# Patient Record
Sex: MALE | Race: WHITE | Employment: FULL TIME | URBAN - METROPOLITAN AREA
[De-identification: names, ages, dates, MRNs, and addresses within clinical notes are randomized per-mention and may not be internally consistent; named-entity substitution may affect disease eponyms.]

---

## 2023-01-16 ENCOUNTER — HOSPITAL ENCOUNTER (OUTPATIENT)
Dept: ULTRASOUND IMAGING | Age: 43
Discharge: HOME | End: 2023-01-16
Payer: COMMERCIAL

## 2023-01-16 DIAGNOSIS — R10.9 STOMACH ACHE: ICD-10-CM

## 2023-01-16 PROCEDURE — 76700 US EXAM ABDOM COMPLETE: CPT

## 2024-01-30 NOTE — PROGRESS NOTES
Interventional Cardiology Consultation     Sher Fields  1980    PCP: Jocelyne Ford APRN - NP  Referring Physician: [facility]  Reason for Referral: bradycardia  Chief Complaint: \"I was in the ER\"  Chief Complaint   Patient presents with    Establish Cardiologist     Chest pains        Subjective:   History of Present Illness: The patient is 43 y.o. male with a past medical history significant for hypertension. Patient recently presented to the [facility] ED 1/11/2024 following a syncopal episode. EKG showed patient to be bradycardic, which prompted the referral. Patient here to establish cardiac care. Patient reports he had a tooth removed 1/11/24 with no problems. Was home when he start feeling \"in and out of it\". Reports lightheadedness and syncope.  His wife was home and called EMS. Patient reports he did not feel himself until the ambulance ride. Patient was taken to [facility] where they did an EKG which came back abnormal, showed bradycardia. Patient denies any recurrent episodes like this. Patient denies any issues with sleeping, lying flat. Patient does report he snores.           Past Medical History:   Diagnosis Date    Hypertension      History reviewed. No pertinent surgical history.  Family History   Problem Relation Age of Onset    Heart Disease Father      Social History     Tobacco Use    Smoking status: Former     Types: Cigarettes    Smokeless tobacco: Former       No Known Allergies    Current Outpatient Medications   Medication Sig Dispense Refill    FLUoxetine (PROZAC) 20 MG capsule Take 1 capsule by mouth daily      lisinopril (PRINIVIL;ZESTRIL) 10 MG tablet       omeprazole (PRILOSEC) 20 MG delayed release capsule        No current facility-administered medications for this visit.       Review of Systems:  Constitutional: No unanticipated weight loss. There's been no change in energy level, sleep pattern, or activity level. No fevers, chills.   Eyes: No

## 2024-02-05 ENCOUNTER — OFFICE VISIT (OUTPATIENT)
Dept: CARDIOLOGY CLINIC | Age: 44
End: 2024-02-05

## 2024-02-05 ENCOUNTER — TELEPHONE (OUTPATIENT)
Dept: CARDIOLOGY CLINIC | Age: 44
End: 2024-02-05

## 2024-02-05 VITALS
HEART RATE: 68 BPM | WEIGHT: 238 LBS | DIASTOLIC BLOOD PRESSURE: 82 MMHG | SYSTOLIC BLOOD PRESSURE: 118 MMHG | OXYGEN SATURATION: 97 %

## 2024-02-05 DIAGNOSIS — R55 VASOVAGAL SYNCOPE: ICD-10-CM

## 2024-02-05 DIAGNOSIS — R00.1 BRADYCARDIA: Primary | ICD-10-CM

## 2024-02-05 RX ORDER — FLUOXETINE HYDROCHLORIDE 20 MG/1
20 CAPSULE ORAL DAILY
COMMUNITY

## 2024-02-05 RX ORDER — LISINOPRIL 10 MG/1
TABLET ORAL
COMMUNITY
Start: 2024-01-31

## 2024-02-05 RX ORDER — OMEPRAZOLE 20 MG/1
CAPSULE, DELAYED RELEASE ORAL
COMMUNITY
Start: 2024-01-08

## 2024-02-05 NOTE — TELEPHONE ENCOUNTER
Monitor placed by Charlene Brown  Monitor company [employer]  Length of monitor 30 days  Monitor ordered by Dr. García  Serial number [id number]  Kit ID   Activation successful prior to pt leaving office? Yes        Placed on spreadsheet.

## 2024-02-06 PROCEDURE — 93270 REMOTE 30 DAY ECG REV/REPORT: CPT | Performed by: INTERNAL MEDICINE

## 2024-02-20 ENCOUNTER — HOSPITAL ENCOUNTER (OUTPATIENT)
Dept: NON INVASIVE DIAGNOSTICS | Age: 44
Discharge: HOME | End: 2024-02-20
Payer: COMMERCIAL

## 2024-02-20 DIAGNOSIS — R00.1 BRADYCARDIA: ICD-10-CM

## 2024-02-20 DIAGNOSIS — R55 VASOVAGAL SYNCOPE: ICD-10-CM

## 2024-02-20 PROCEDURE — 93306 TTE W/DOPPLER COMPLETE: CPT

## 2024-02-26 ENCOUNTER — OFFICE VISIT (OUTPATIENT)
Dept: SLEEP MEDICINE | Age: 44
End: 2024-02-26
Payer: COMMERCIAL

## 2024-02-26 VITALS
BODY MASS INDEX: 32.15 KG/M2 | OXYGEN SATURATION: 100 % | DIASTOLIC BLOOD PRESSURE: 70 MMHG | SYSTOLIC BLOOD PRESSURE: 115 MMHG | HEIGHT: 72 IN | WEIGHT: 237.4 LBS | HEART RATE: 50 BPM | TEMPERATURE: 98.2 F | RESPIRATION RATE: 18 BRPM

## 2024-02-26 DIAGNOSIS — R00.1 BRADYCARDIA: ICD-10-CM

## 2024-02-26 DIAGNOSIS — R06.83 SNORING: ICD-10-CM

## 2024-02-26 DIAGNOSIS — I10 ESSENTIAL HYPERTENSION: ICD-10-CM

## 2024-02-26 DIAGNOSIS — G47.33 OSA (OBSTRUCTIVE SLEEP APNEA): Primary | ICD-10-CM

## 2024-02-26 PROCEDURE — 3074F SYST BP LT 130 MM HG: CPT | Performed by: PSYCHIATRY & NEUROLOGY

## 2024-02-26 PROCEDURE — 3078F DIAST BP <80 MM HG: CPT | Performed by: PSYCHIATRY & NEUROLOGY

## 2024-02-26 PROCEDURE — G8417 CALC BMI ABV UP PARAM F/U: HCPCS | Performed by: PSYCHIATRY & NEUROLOGY

## 2024-02-26 PROCEDURE — 1036F TOBACCO NON-USER: CPT | Performed by: PSYCHIATRY & NEUROLOGY

## 2024-02-26 PROCEDURE — 99204 OFFICE O/P NEW MOD 45 MIN: CPT | Performed by: PSYCHIATRY & NEUROLOGY

## 2024-02-26 PROCEDURE — G8484 FLU IMMUNIZE NO ADMIN: HCPCS | Performed by: PSYCHIATRY & NEUROLOGY

## 2024-02-26 PROCEDURE — G8427 DOCREV CUR MEDS BY ELIG CLIN: HCPCS | Performed by: PSYCHIATRY & NEUROLOGY

## 2024-02-26 ASSESSMENT — ENCOUNTER SYMPTOMS
ALLERGIC/IMMUNOLOGIC NEGATIVE: 1
RESPIRATORY NEGATIVE: 1
EYES NEGATIVE: 1
GASTROINTESTINAL NEGATIVE: 1

## 2024-02-26 ASSESSMENT — SLEEP AND FATIGUE QUESTIONNAIRES
HOW LIKELY ARE YOU TO NOD OFF OR FALL ASLEEP WHILE SITTING QUIETLY AFTER LUNCH WITHOUT ALCOHOL: 0
HOW LIKELY ARE YOU TO NOD OFF OR FALL ASLEEP WHILE WATCHING TV: 2
HOW LIKELY ARE YOU TO NOD OFF OR FALL ASLEEP WHILE SITTING AND TALKING TO SOMEONE: 0
HOW LIKELY ARE YOU TO NOD OFF OR FALL ASLEEP WHILE SITTING INACTIVE IN A PUBLIC PLACE: 0
ESS TOTAL SCORE: 4
NECK CIRCUMFERENCE (INCHES): 17
HOW LIKELY ARE YOU TO NOD OFF OR FALL ASLEEP WHILE SITTING AND READING: 0
HOW LIKELY ARE YOU TO NOD OFF OR FALL ASLEEP IN A CAR, WHILE STOPPED FOR A FEW MINUTES IN TRAFFIC: 0
HOW LIKELY ARE YOU TO NOD OFF OR FALL ASLEEP WHILE LYING DOWN TO REST IN THE AFTERNOON WHEN CIRCUMSTANCES PERMIT: 2
HOW LIKELY ARE YOU TO NOD OFF OR FALL ASLEEP WHEN YOU ARE A PASSENGER IN A CAR FOR AN HOUR WITHOUT A BREAK: 0

## 2024-02-26 NOTE — PATIENT INSTRUCTIONS
Orders Placed This Encounter   Procedures    Home Sleep Study     Standing Status:   Future     Standing Expiration Date:   2/26/2025     Order Specific Question:   Location For Sleep Study     Answer:   [place]     Order Specific Question:   Select Sleep Lab Location     Answer:   [facility]    Sleep Study with PAP Titration     Standing Status:   Future     Standing Expiration Date:   2/26/2025     Order Specific Question:   Sleep Study Titration Type     Answer:   CPAP     Order Specific Question:   Location For Sleep Study     Answer:   [place]     Order Specific Question:   Select Sleep Lab Location     Answer:   [facility]

## 2024-02-26 NOTE — PROGRESS NOTES
MD MATILDA Augustin Board Certified in Sleep Medicine  Certified inBehavioral Sleep Medicine  Board Certified in Neurology [facility]  [address]   [address]  [place]  [address]  P- [phone]   [facility]   [address]  [address]   [place], [place] [address]           [facility] [place] [facility]  [address] [address] [phone]    Subjective:     Patient ID: Sher Fields is a 43 y.o. male.    Chief Complaint   Patient presents with    [facility]    Loss of Consciousness         HPI:        Sher Fields is a 43 y.o. male referred by Dr. García for a sleep evaluation. He complains of snoring, tossing and turning but he denies snorting, choking, periods of not breathing, knees buckling with laughing, completely or partially paralyzed while falling asleep or waking up, take naps during the day, noisy environment, uncomfortable room temperature, uncomfortable bedding.  Symptoms began several years ago, unchanged since that time.   The patient's bed-partner confirmed the snoring without stopped breathing at night  SLEEP SCHEDULE: Goes to bed around 11 PM in the weekdays and 12 AM in the weekends. It usually takes the patient 60 minutes to fall asleep. The patient gets up 0 per night to go to the bathroom. The Patient finally gets up at 6 AM during the weekdays and 7 AM in the weekends.  The Patient scored Mansfield Sleepiness Score: 4 on Mansfield Sleepiness Scale ( more than 10 is indicative of daytime sleepiness)and 19 in fatigue scale ( more than 36 is indicative of daytime fatigue). Uses mouth guard.  Previous evaluation and treatment has included- none.    has gained 10-15 in the last 5 years,      DOT/CDL - N/A  FAA/'slicense - N/A  The patient HTN, and arrhythmia are stable. only on syncopal episode 1.5 months ago.      Previous Report(s) Reviewed:

## 2024-03-06 ENCOUNTER — HOSPITAL ENCOUNTER (OUTPATIENT)
Dept: SLEEP CENTER | Age: 44
Discharge: HOME | End: 2024-03-06
Attending: PSYCHIATRY & NEUROLOGY
Payer: COMMERCIAL

## 2024-03-06 DIAGNOSIS — G47.33 OSA (OBSTRUCTIVE SLEEP APNEA): ICD-10-CM

## 2024-03-06 PROCEDURE — 95806 SLEEP STUDY UNATT&RESP EFFT: CPT

## 2024-03-07 PROBLEM — G47.33 OSA (OBSTRUCTIVE SLEEP APNEA): Status: ACTIVE | Noted: 2024-03-07

## 2024-03-11 ENCOUNTER — TELEPHONE (OUTPATIENT)
Dept: PULMONOLOGY | Age: 44
End: 2024-03-11

## 2024-03-11 PROCEDURE — 93272 ECG/REVIEW INTERPRET ONLY: CPT | Performed by: INTERNAL MEDICINE

## 2024-03-11 NOTE — TELEPHONE ENCOUNTER
HOME Sleep study showed mild JOSE MIGUEL (on a scale of mild, moderate and severe).  AHI was 12.9  per hr. (Average times per hr breathing was obstructed).  O2 Desaturations to 90% (lowest o2)   Dr Recommends:    Follow up with the patient's sleep physician to discuss results  A trial of CPAP titration is recommended with supplemental oxygen per protocol if needed.  Avoid sedatives, alcohol and caffeinated drinks at bedtime.  Avoid driving while sleepy.       LMOM to call

## 2024-03-12 NOTE — TELEPHONE ENCOUNTER
The patient has been notified of this information and all questions answered.    Transferred to sleep lab

## 2024-03-14 DIAGNOSIS — R00.1 BRADYCARDIA: ICD-10-CM

## 2024-03-14 DIAGNOSIS — R00.1 SEVERE SINUS BRADYCARDIA: Primary | ICD-10-CM

## 2024-03-14 DIAGNOSIS — R55 VASOVAGAL SYNCOPE: ICD-10-CM

## 2024-03-26 ENCOUNTER — HOSPITAL ENCOUNTER (OUTPATIENT)
Dept: SLEEP CENTER | Age: 44
Discharge: HOME | End: 2024-03-26
Payer: COMMERCIAL

## 2024-03-26 DIAGNOSIS — G47.33 OSA (OBSTRUCTIVE SLEEP APNEA): ICD-10-CM

## 2024-03-26 PROCEDURE — 95811 POLYSOM 6/>YRS CPAP 4/> PARM: CPT

## 2024-03-27 NOTE — PROGRESS NOTES
Sher VIANEY Fields         : 1980  [] [facility]     [] [facility]      [] [facility]     [facility]    [] [facility]  [] [facility]  [] [facility]   [] [facility] [] Other:  Diagnosis: [x] JOSE MIGUEL (G47.33) [] CSA (G47.31) [] Apnea (G47.30)   Length of Need: [] 12 Months [x] 99 Months [] Other:    Machine (RASHID!):  [x] ResMed AirSense     Auto [] Other:     [x]  CPAP () [] Bilevel ()   Mode: [x] Auto [] Spontaneous    Mode: [] Auto [] Spontaneous           Between 9 and 12 cm                 Comfort Settings:     If the order for CPAP  - Ramp Pressure: 5 cmH2O                                        - Ramp time: 15 min                                     -  Flex/EPR - 3 full time       Humidifier: [x] Heated ()        [x] Water chamber replacement ()/ 1 per 6 months        Mask:  Please always start with the mask the patient used during the titraion   [x] Nasal () /1 per 3 months    [x] Patient choice -Size and fit mask    [x] Dispensemedium Airfit P10 nasal pillows:     [x] Headgear () / 1 per 6 months        [x] Replacement Nasal Pillows ()/2 per month         Tubing: [x] Heated ()/1 per 3 months    [] Standard ()/1 per 3 months [] Other:           Filters: [x] Non-disposable ()/1 per 6 months     [x] Ultra-Fine, Disposable ()/2 per month        Miscellaneous: [x] Chin Strap ()/ 1 per 6 months [] O2 bleed-in:       LPM   [] Oximetry on CPAP/Bilevel []  Other:          Start Order Date: 24    MEDICAL JUSTIFICATION:  I, the undersigned, certify that the above prescribed supplies are medically necessary for this patient’s wellbeing.  In my opinion, the supplies are both reasonable and necessary in reference to accepted standards of medicalpractice in treatment of this patient’s condition.    Selma Bowers MD      NPI: [id number]       Order Signed Date: 24    Electronically signed by Selma Bowers MD on 3/27/2024 at 10:38 AM

## 2024-03-29 ENCOUNTER — TELEPHONE (OUTPATIENT)
Dept: PULMONOLOGY | Age: 44
End: 2024-03-29

## 2024-03-29 NOTE — TELEPHONE ENCOUNTER
PSG with CPAP Sleep study showed mild JOSE MIGUEL (on a scale of mild, moderate and severe).  AHI was 12.9  per hr. (Average times per hr breathing was obstructed).  O2 Desaturations to 90% (lowest o2)   Adequate control of events at a CPAP pressure of 9 cm     Recommends:    Follow up with the patient's sleep physician to discuss results  If CPAP therapy is utilized an auto CPAP of 9 cm and max pressure of 12 cm would be suggested.   Avoid sedatives, alcohol and caffeinated drinks at bedtime.  Avoid driving while sleepy.

## 2024-04-02 NOTE — TELEPHONE ENCOUNTER
The patient has been notified of this information and all questions answered.  Dme sent in [employer]

## 2024-04-10 ENCOUNTER — PATIENT MESSAGE (OUTPATIENT)
Dept: SLEEP MEDICINE | Age: 44
End: 2024-04-10

## 2024-04-10 NOTE — TELEPHONE ENCOUNTER
From: Sher Fields  To: Dr. Selma Bowers  Sent: 4/10/2024 10:37 AM EDT  Subject: CPAP distributor    I was asked to send in a company for a CPAP that would take my insurance. I have this verified with the company below:    [employer]  [address] [place], [place] [id number] [phone]    Please let me know the next steps.

## 2024-05-16 ENCOUNTER — TELEPHONE (OUTPATIENT)
Dept: PULMONOLOGY | Age: 44
End: 2024-05-16

## 2024-05-16 NOTE — TELEPHONE ENCOUNTER
Patient set up on 5/9 [facility] to schedule his 31-90 follow up appt with Dr Bowers